# Patient Record
Sex: FEMALE | Race: BLACK OR AFRICAN AMERICAN | NOT HISPANIC OR LATINO
[De-identification: names, ages, dates, MRNs, and addresses within clinical notes are randomized per-mention and may not be internally consistent; named-entity substitution may affect disease eponyms.]

---

## 2022-11-01 ENCOUNTER — RESULT REVIEW (OUTPATIENT)
Age: 62
End: 2022-11-01

## 2023-04-29 ENCOUNTER — EMERGENCY (EMERGENCY)
Facility: HOSPITAL | Age: 63
LOS: 1 days | Discharge: HOME | End: 2023-04-29
Attending: EMERGENCY MEDICINE | Admitting: EMERGENCY MEDICINE
Payer: COMMERCIAL

## 2023-04-29 VITALS
DIASTOLIC BLOOD PRESSURE: 80 MMHG | OXYGEN SATURATION: 100 % | RESPIRATION RATE: 18 BRPM | HEART RATE: 64 BPM | TEMPERATURE: 98 F | SYSTOLIC BLOOD PRESSURE: 138 MMHG

## 2023-04-29 VITALS
TEMPERATURE: 98 F | DIASTOLIC BLOOD PRESSURE: 88 MMHG | HEART RATE: 73 BPM | OXYGEN SATURATION: 100 % | SYSTOLIC BLOOD PRESSURE: 180 MMHG | RESPIRATION RATE: 18 BRPM

## 2023-04-29 LAB
ALBUMIN SERPL ELPH-MCNC: 4.2 G/DL — SIGNIFICANT CHANGE UP (ref 3.3–5)
ALP SERPL-CCNC: 79 U/L — SIGNIFICANT CHANGE UP (ref 40–120)
ALT FLD-CCNC: 30 U/L — SIGNIFICANT CHANGE UP (ref 4–33)
ANION GAP SERPL CALC-SCNC: 13 MMOL/L — SIGNIFICANT CHANGE UP (ref 7–14)
APPEARANCE UR: CLEAR — SIGNIFICANT CHANGE UP
AST SERPL-CCNC: 23 U/L — SIGNIFICANT CHANGE UP (ref 4–32)
BASOPHILS # BLD AUTO: 0.03 K/UL — SIGNIFICANT CHANGE UP (ref 0–0.2)
BASOPHILS NFR BLD AUTO: 0.9 % — SIGNIFICANT CHANGE UP (ref 0–2)
BILIRUB SERPL-MCNC: 0.3 MG/DL — SIGNIFICANT CHANGE UP (ref 0.2–1.2)
BILIRUB UR-MCNC: NEGATIVE — SIGNIFICANT CHANGE UP
BUN SERPL-MCNC: 13 MG/DL — SIGNIFICANT CHANGE UP (ref 7–23)
CALCIUM SERPL-MCNC: 9.7 MG/DL — SIGNIFICANT CHANGE UP (ref 8.4–10.5)
CHLORIDE SERPL-SCNC: 102 MMOL/L — SIGNIFICANT CHANGE UP (ref 98–107)
CO2 SERPL-SCNC: 26 MMOL/L — SIGNIFICANT CHANGE UP (ref 22–31)
COLOR SPEC: SIGNIFICANT CHANGE UP
CREAT SERPL-MCNC: 0.83 MG/DL — SIGNIFICANT CHANGE UP (ref 0.5–1.3)
DIFF PNL FLD: NEGATIVE — SIGNIFICANT CHANGE UP
EGFR: 80 ML/MIN/1.73M2 — SIGNIFICANT CHANGE UP
EOSINOPHIL # BLD AUTO: 0.09 K/UL — SIGNIFICANT CHANGE UP (ref 0–0.5)
EOSINOPHIL NFR BLD AUTO: 2.6 % — SIGNIFICANT CHANGE UP (ref 0–6)
GLUCOSE SERPL-MCNC: 149 MG/DL — HIGH (ref 70–99)
GLUCOSE UR QL: NEGATIVE — SIGNIFICANT CHANGE UP
HCT VFR BLD CALC: 43.9 % — SIGNIFICANT CHANGE UP (ref 34.5–45)
HGB BLD-MCNC: 14.1 G/DL — SIGNIFICANT CHANGE UP (ref 11.5–15.5)
IANC: 1.25 K/UL — LOW (ref 1.8–7.4)
IMM GRANULOCYTES NFR BLD AUTO: 0.3 % — SIGNIFICANT CHANGE UP (ref 0–0.9)
KETONES UR-MCNC: NEGATIVE — SIGNIFICANT CHANGE UP
LEUKOCYTE ESTERASE UR-ACNC: NEGATIVE — SIGNIFICANT CHANGE UP
LIDOCAIN IGE QN: 40 U/L — SIGNIFICANT CHANGE UP (ref 7–60)
LYMPHOCYTES # BLD AUTO: 1.74 K/UL — SIGNIFICANT CHANGE UP (ref 1–3.3)
LYMPHOCYTES # BLD AUTO: 51 % — HIGH (ref 13–44)
MCHC RBC-ENTMCNC: 27.7 PG — SIGNIFICANT CHANGE UP (ref 27–34)
MCHC RBC-ENTMCNC: 32.1 GM/DL — SIGNIFICANT CHANGE UP (ref 32–36)
MCV RBC AUTO: 86.2 FL — SIGNIFICANT CHANGE UP (ref 80–100)
MONOCYTES # BLD AUTO: 0.29 K/UL — SIGNIFICANT CHANGE UP (ref 0–0.9)
MONOCYTES NFR BLD AUTO: 8.5 % — SIGNIFICANT CHANGE UP (ref 2–14)
NEUTROPHILS # BLD AUTO: 1.25 K/UL — LOW (ref 1.8–7.4)
NEUTROPHILS NFR BLD AUTO: 36.7 % — LOW (ref 43–77)
NITRITE UR-MCNC: NEGATIVE — SIGNIFICANT CHANGE UP
NRBC # BLD: 0 /100 WBCS — SIGNIFICANT CHANGE UP (ref 0–0)
NRBC # FLD: 0 K/UL — SIGNIFICANT CHANGE UP (ref 0–0)
PH UR: 6 — SIGNIFICANT CHANGE UP (ref 5–8)
PLATELET # BLD AUTO: 283 K/UL — SIGNIFICANT CHANGE UP (ref 150–400)
POTASSIUM SERPL-MCNC: 3.7 MMOL/L — SIGNIFICANT CHANGE UP (ref 3.5–5.3)
POTASSIUM SERPL-SCNC: 3.7 MMOL/L — SIGNIFICANT CHANGE UP (ref 3.5–5.3)
PROT SERPL-MCNC: 7.3 G/DL — SIGNIFICANT CHANGE UP (ref 6–8.3)
PROT UR-MCNC: ABNORMAL
RBC # BLD: 5.09 M/UL — SIGNIFICANT CHANGE UP (ref 3.8–5.2)
RBC # FLD: 13.5 % — SIGNIFICANT CHANGE UP (ref 10.3–14.5)
SODIUM SERPL-SCNC: 141 MMOL/L — SIGNIFICANT CHANGE UP (ref 135–145)
SP GR SPEC: 1.02 — SIGNIFICANT CHANGE UP (ref 1.01–1.05)
UROBILINOGEN FLD QL: SIGNIFICANT CHANGE UP
WBC # BLD: 3.41 K/UL — LOW (ref 3.8–10.5)
WBC # FLD AUTO: 3.41 K/UL — LOW (ref 3.8–10.5)

## 2023-04-29 PROCEDURE — 99284 EMERGENCY DEPT VISIT MOD MDM: CPT

## 2023-04-29 PROCEDURE — 76830 TRANSVAGINAL US NON-OB: CPT | Mod: 26

## 2023-04-29 PROCEDURE — 76770 US EXAM ABDO BACK WALL COMP: CPT | Mod: 26

## 2023-04-29 NOTE — ED ADULT NURSE NOTE - OBJECTIVE STATEMENT
Received the patient in spot 26 with c/o LLQ abdominal pain for two weeks and came today to check everything is Ok. Not in acute distress. Alert and oriented x 4, safety maintained.  20G IV line inserted in left AC. Due labs sent. will continue to monitor.

## 2023-04-29 NOTE — ED PROVIDER NOTE - CLINICAL SUMMARY MEDICAL DECISION MAKING FREE TEXT BOX
Allyson Chavarria DO PGY-2  62 year old female with PMH HTN presents to the ED with 2 weeks of sharp LLQ pain, intermittent, lasts minutes long and can sometimes be severe. Nontoxic appearing, hemodynamically stable. Will evaluate for intraabdominal pathology, obtain CT, UA, labs, treat pain prn, and re-assess for dispo. Allyson Chavarria DO PGY-2  62 year old female with PMH HTN presents to the ED with 2 weeks of sharp LLQ pain, intermittent, lasts minutes long and can sometimes be severe. Nontoxic appearing, hemodynamically stable. Pain now mild. Will obtain labs, TVUS and renal US, labs, UA.

## 2023-04-29 NOTE — ED PROVIDER NOTE - NSFOLLOWUPCLINICS_GEN_ALL_ED_FT
[facility] General Internal Medicine  General Internal Medicine  [address] [place] [id number]  Phone: [phone]  Fax:   Follow Up Time: 4-6 Days    Gastroenterology at [facility]  Gastroenterology  [address] [place] [id number]  Phone: [phone]  Fax:   Follow Up Time: 4-6 Days

## 2023-04-29 NOTE — ED PROVIDER NOTE - PHYSICAL EXAMINATION
PHYSICAL EXAM:  CONSTITUTIONAL: Well appearing, awake, alert, oriented to person, place, time/situation and in no apparent distress.  HEAD: Atraumatic  EYES: Clear bilaterally, pupils equal, round and reactive to light.  ENMT: Airway patent, Nasal mucosa clear. Mouth with normal mucosa. Uvula is midline.   CARDIAC: Normal rate, regular rhythm. +S1/S2. No murmurs, rubs or gallops.  RESPIRATORY: Breathing unlabored. Breath sounds clear and equal bilaterally.  ABDOMEN:  LLQ tenderness to palpation. Soft, abdomen otherwise nontender, nondistended. No rebound tenderness or guarding.  NEUROLOGICAL: Alert and oriented, no focal deficits, no motor or sensory deficits.   MSK: No clubbing, cyanosis, or edema. Full range of motion of all extremities.   SKIN: Skin warm and dry. No evidence of rashes or lesions.

## 2023-04-29 NOTE — ED PROVIDER NOTE - PATIENT PORTAL LINK FT
You can access the FollowMyHealth Patient Portal offered by [facility] by registering at the following website: [email or web address]. By joining [facility]’s FollowMyHealth portal, you will also be able to view your health information using other applications (apps) compatible with our system.

## 2023-04-29 NOTE — ED PROVIDER NOTE - NSFOLLOWUPINSTRUCTIONS_ED_ALL_ED_FT
(1) Follow up with your primary care physician as discussed. Listed below are the specialists that will be necessary to see as an outpatient to continue the workup.  Please call the numbers listed below or [phone] to set up the necessary appointments.    (2) Immediately seek care at your nearest emergency room if your symptoms worsen, persist, or do not resolve     Abdominal Pain    WHAT YOU NEED TO KNOW:    Abdominal pain can be dull, achy, or sharp. You may have pain in one area of your abdomen, or in your entire abdomen. Your pain may be caused by a condition such as constipation, food sensitivity or poisoning, infection, or a blockage. Abdominal pain can also be from a hernia, appendicitis, or an ulcer. Liver, gallbladder, or kidney conditions can also cause abdominal pain. The cause of your abdominal pain may not be known.     DISCHARGE INSTRUCTIONS:    Call your local emergency number (911 in the ) if:   •You have new chest pain or shortness of breath.    Return to the emergency department if:   •You have pulsing pain in your upper abdomen or lower back that suddenly becomes constant.  •Your pain is in the right lower abdominal area and worsens with movement.  •You have a fever over 100.4°F (38°C) or shaking chills.  •You are vomiting and cannot keep food or liquids down.  •Your pain does not improve or gets worse over the next 8 to 12 hours.  •You see blood in your vomit or bowel movements, or they look black and tarry.  •Your skin or the whites of your eyes turn yellow.  •You are a woman and have a large amount of vaginal bleeding that is not your monthly period.    Call your doctor if:   •You have pain in your lower back.  •You are a man and have pain in your testicles.  •You have pain when you urinate.  •You have questions or concerns about your condition or care.

## 2023-04-29 NOTE — ED PROVIDER NOTE - OBJECTIVE STATEMENT
62 year old female with PMH HTN presents to the ED with 2 weeks of sharp LLQ pain, intermittent, lasts minutes long and can sometimes be severe. No radiation. Denies fever, chills, chest pain, shortness of breath, nausea, vomiting, diarrhea, dysuria, hematuria, blood in stools. No history of similar symptoms. Has history of multiple abdominal surgeries including c section, cholecystectomy, hernia repairs. Had colonoscopy 1 year ago with polyps. Had normal bowel movement this AM.

## 2023-04-29 NOTE — ED PROVIDER NOTE - PROGRESS NOTE DETAILS
O'Sam DO PGY-3: received sign out on this patient. Dispo pending US/TVUS O'Sam DO PGY-3: Bimanual exam performed (chaperoned by Dr. Gonzales), no masses or gross adnexal tenderness noted Hari NEAL PGY-3: Results explained to patient. Explained strict return precautions. Will dc w/ internal med clinic info and GI clinic info

## 2023-04-29 NOTE — ED PROVIDER NOTE - NSICDXPASTSURGICALHX_GEN_ALL_CORE_FT
PAST SURGICAL HISTORY:  Delivery by emergency caesarean section     H/O hernia repair     History of cholecystectomy

## 2023-04-30 LAB
CULTURE RESULTS: SIGNIFICANT CHANGE UP
SPECIMEN SOURCE: SIGNIFICANT CHANGE UP

## 2023-07-25 ENCOUNTER — EMERGENCY (EMERGENCY)
Facility: HOSPITAL | Age: 63
LOS: 1 days | Discharge: HOME | End: 2023-07-25
Attending: EMERGENCY MEDICINE | Admitting: EMERGENCY MEDICINE
Payer: COMMERCIAL

## 2023-07-25 VITALS
HEART RATE: 77 BPM | TEMPERATURE: 98 F | OXYGEN SATURATION: 100 % | DIASTOLIC BLOOD PRESSURE: 94 MMHG | RESPIRATION RATE: 18 BRPM | SYSTOLIC BLOOD PRESSURE: 183 MMHG

## 2023-07-25 VITALS
SYSTOLIC BLOOD PRESSURE: 154 MMHG | RESPIRATION RATE: 16 BRPM | OXYGEN SATURATION: 100 % | TEMPERATURE: 98 F | HEART RATE: 65 BPM | DIASTOLIC BLOOD PRESSURE: 87 MMHG

## 2023-07-25 PROCEDURE — 99284 EMERGENCY DEPT VISIT MOD MDM: CPT

## 2023-07-25 RX ORDER — ACETAMINOPHEN 500 MG
975 TABLET ORAL ONCE
Refills: 0 | Status: COMPLETED | OUTPATIENT
Start: 2023-07-25 | End: 2023-07-25

## 2023-07-25 RX ORDER — KETOROLAC TROMETHAMINE 30 MG/ML
15 SYRINGE (ML) INJECTION ONCE
Refills: 0 | Status: DISCONTINUED | OUTPATIENT
Start: 2023-07-25 | End: 2023-07-25

## 2023-07-25 RX ORDER — SODIUM CHLORIDE 9 MG/ML
1000 INJECTION INTRAMUSCULAR; INTRAVENOUS; SUBCUTANEOUS ONCE
Refills: 0 | Status: COMPLETED | OUTPATIENT
Start: 2023-07-25 | End: 2023-07-25

## 2023-07-25 RX ORDER — METOCLOPRAMIDE HCL 10 MG
10 TABLET ORAL ONCE
Refills: 0 | Status: COMPLETED | OUTPATIENT
Start: 2023-07-25 | End: 2023-07-25

## 2023-07-25 RX ADMIN — SODIUM CHLORIDE 1000 MILLILITER(S): 9 INJECTION INTRAMUSCULAR; INTRAVENOUS; SUBCUTANEOUS at 02:52

## 2023-07-25 RX ADMIN — Medication 15 MILLIGRAM(S): at 02:53

## 2023-07-25 RX ADMIN — Medication 10 MILLIGRAM(S): at 02:54

## 2023-07-25 RX ADMIN — Medication 15 MILLIGRAM(S): at 03:37

## 2023-07-25 RX ADMIN — Medication 975 MILLIGRAM(S): at 02:53

## 2023-07-25 RX ADMIN — Medication 975 MILLIGRAM(S): at 03:37

## 2023-07-25 NOTE — ED PROVIDER NOTE - PATIENT PORTAL LINK FT
You can access the FollowMyHealth Patient Portal offered by [facility] by registering at the following website: [email or web address]. By joining [facility]’s FollowMyHealth portal, you will also be able to view your health information using other applications (apps) compatible with our system.

## 2023-07-25 NOTE — ED PROVIDER NOTE - CLINICAL SUMMARY MEDICAL DECISION MAKING FREE TEXT BOX
62-year-old female with history of hypertension, migraine presenting to ED with headache x2 days, sharp shooting in nature, intermittent.   No associated symptoms like nausea, vomiting,  fever, chills, vision changes.  Has not take anything for pain.  Vital stable.  Exam without any focal neurodeficits, clear lungs auscultation, no murmurs rubs or gallops, no abdominal tenderness palpation.  this is likely her normal migraine headache.  Will treat with Reglan, Toradol, Tylenol and fluids.  Will reassess.  No indication for imaging at this time.  close reassessment. 62-year-old female with history of hypertension, migraine presenting to ED with headache x2 days, sharp shooting in nature, intermittent.   No associated symptoms like nausea, vomiting,  fever, chills, vision changes.  Has not take anything for pain.  No red flag sx of HA.  Vital stable.  Exam without any focal neurodeficits, clear lungs auscultation, no murmurs rubs or gallops, no abdominal tenderness palpation.  this is likely her normal migraine headache.  Will treat with Reglan, Toradol, Tylenol and fluids.  Will reassess.  No indication for imaging at this time.  close reassessment.

## 2023-07-25 NOTE — ED ADULT NURSE NOTE - NSFALLUNIVINTERV_ED_ALL_ED
Bed/Stretcher in lowest position, wheels locked, appropriate side rails in place/Call bell, personal items and telephone in reach/Instruct patient to call for assistance before getting out of bed/chair/stretcher/Non-slip footwear applied when patient is off stretcher/Clarissa to call system/Physically safe environment - no spills, clutter or unnecessary equipment/Purposeful proactive rounding/Room/bathroom lighting operational, light cord in reach

## 2023-07-25 NOTE — ED PROVIDER NOTE - NSFOLLOWUPINSTRUCTIONS_ED_ALL_ED_FT
You were seen in the Emergency Department for headache. Lab and imaging results, if performed, were discussed with you along with your discharge diagnosis.    You will receive a call to make an appointment with Neurology. List of providers and their information has been given. Follow up with your doctor in 1 week - bring copies of your results if you were given. If you do not have a primary doctor, please call [phone] to find one convenient for you.    Continue all prescribed medications.     To control your pain at home, you should take Ibuprofen 400 mg along with Tylenol 650mg-1000mg every 6 to 8 hours. Limit your maximum daily Tylenol from all sources to 4000mg. Be aware that many other medications contain acetaminophen which is also known as Tylenol. Taking Tylenol and Ibuprofen together has been shown to be more effective at relieving pain than taking them separately. These are both over the counter medications that you can  at your local pharmacy without a prescription. You need to respect all of the warnings on the bottles. You shouldn’t take these medications for more than a week without following up with your doctor. Both medications come with certain risks and side effects that you need to discuss with your doctor, especially if you are taking them for a prolonged period.    Return to ED for any new or worsening symptoms including but not limited to: development of chest pain, shortness of breath, fever, vomiting, focal numbness, weakness or tingling, any severe CP, headache, abdominal pain, back pain.      Rest and keep yourself hydrated with fluids.

## 2023-07-25 NOTE — ED PROVIDER NOTE - ATTENDING CONTRIBUTION TO CARE
62-year-old woman, history of hypertension and migraines now presents with 2 days of right episodic temporal sharp shooting headaches that are unprovoked.  She describes headaches like this in the past but not as intense.  No URI symptoms but patient states that she has had episodes of fullness in her right ear when the headaches come which resolved when the headaches go away.  No visual changes, confusion per  at bedside, unsteadiness or falls, strength or sensory changes/deficits.  No fever or B symptoms, URI symptoms, urinary symptoms, neck stiffness or pain.  No photophobia or phonophobia.  No trauma.    VS: afebrile, others notable for HTN, otherwise reassuring   Gen: Well appearing in NAD  Head: NC/AT  ENT: moist mucous membranes, R TM clear   Neck: trachea midline, FROM (painless) - neg Brudzinski   Resp:  No distress  Ext: no deformities  Neuro: A&Ox3, spont. interactive. CN2-12 intact. GCS 15. Strength 5/5 b/l UEs & LEs. No gross sensory deficits. No pronator drift b/l UEs. Finger to nose intact b/l. gait normal/stable/unassisted.   Skin:  Warm and dry as visualized  Psych:  appropriate affect and mood    Initial ED MDM: well appearing pt w/ reassuring exam, hx also not c/w ICH, meningitis, CVST or CVA.  Will treat symptomatically & reassess.

## 2023-07-25 NOTE — ED PROVIDER NOTE - PHYSICAL EXAMINATION
General: Alert and Orientated x 3. No apparent distress.  Head: Normocephalic and atraumatic.  Ears:   TMs intact bilaterally, appropriate light reflex.  Eyes: PERRLA with EOMI.  Neck: Supple. Trachea midline.   Cardiac: Normal S1 and S2 w/ RRR. No murmurs appreciated. No JVD appreciated.  Pulmonary: CTA bilaterally. No increased WOB. No wheezes or crackles.  Abdominal: Soft, non-tender. (+) bowel sounds appreciated in all 4 quadrants. No hepatosplenomegaly.   Neurologic:  cranial nerves II to XII  grossly intact, finger-to-nose normal, gait normal.   Musculoskeletal: Strength appropriate in all 4 extremities for age with no limited ROM.  Skin: Color appropriate for race. Intact, warm, and well-perfused.  Psychiatric: Appropriate mood and affect. No apparent risk to self or others.

## 2023-07-25 NOTE — ED PROVIDER NOTE - OBJECTIVE STATEMENT
Patient is a 62-year-old female with history of hypertension, migraines presenting to the ED with headache.  Headache started 2 days ago, it is in the right  temporal region, it is coming and going in nature, sharp shooting.  Has had headaches like this in the past.  States it was preceded by ringing in her ear.  Has not taken anything for the pain.  No vision changes, nausea, vomiting, fever, chills,   Neck stiffness, numbness, tingling, photophobia, phonophobia, URI symptoms, urinary changes, bowel changes,  falls or injuries.  Has not followed up with a neurologist for her persistent headaches.

## 2023-07-25 NOTE — ED ADULT TRIAGE NOTE - CHIEF COMPLAINT QUOTE
c/o intermittent headache (top of head) since yesterday. currently endorsing no pain at this time. hx migraine, HTN

## 2023-07-25 NOTE — ED PROVIDER NOTE - NS ED ROS FT
CONSTITUTIONAL: No fevers, no chills, no lightheadedness, no dizziness  EYES: no visual changes, no eye pain  EARS: no ear drainage, no ear pain, no change in hearing  NOSE: no nasal congestion  MOUTH/THROAT: no sore throat  CV: No chest pain, no palpitations  RESP: No SOB, no cough  GI: No n/v/d, no abd pain  : no dysuria, no hematuria, no flank pain  MSK: no back pain, no extremity pain  SKIN: no rashes  NEURO: + headache, no focal weakness, no decreased sensation/parasthesias   PSYCHIATRIC: no known mental health issues

## 2023-07-25 NOTE — ED ADULT NURSE NOTE - OBJECTIVE STATEMENT
BREAK RN: report rcd from primary RN Yasmany. pt a&ox4 c/o intermittent headache that began yesterday worsening prior to arrival bring pt to ER. pt past medical history of HTN and migraines. pt states feels like previous migraines. pt with no vision changes. pt with no facial droop. pt no neuro or sensory deficits. pt abdomen soft and nondistended. pt denies chest pain, sob, nausea, vomiting, dizziness, headache, fevers/chills at this time. 20g to the LAC with no redness or swelling. pt endorses relief from medication modalities from primary RN. pt appears in NAD. pt respirations even and unlabored with no accessory muscle use pending dispo.

## 2023-07-25 NOTE — ED ADULT NURSE NOTE - NSICDXPASTSURGICALHX_GEN_ALL_CORE_FT
1
PAST SURGICAL HISTORY:  Delivery by emergency caesarean section     H/O hernia repair     History of cholecystectomy

## 2023-09-24 ENCOUNTER — EMERGENCY (EMERGENCY)
Facility: HOSPITAL | Age: 63
LOS: 1 days | Discharge: HOME | End: 2023-09-24
Attending: EMERGENCY MEDICINE | Admitting: EMERGENCY MEDICINE
Payer: COMMERCIAL

## 2023-09-24 VITALS
OXYGEN SATURATION: 100 % | DIASTOLIC BLOOD PRESSURE: 83 MMHG | TEMPERATURE: 99 F | HEART RATE: 89 BPM | SYSTOLIC BLOOD PRESSURE: 176 MMHG | RESPIRATION RATE: 18 BRPM

## 2023-09-24 PROCEDURE — 73610 X-RAY EXAM OF ANKLE: CPT | Mod: 26,LT

## 2023-09-24 PROCEDURE — 99284 EMERGENCY DEPT VISIT MOD MDM: CPT

## 2023-09-24 PROCEDURE — 73630 X-RAY EXAM OF FOOT: CPT | Mod: 26,LT

## 2023-09-24 RX ORDER — ACETAMINOPHEN 500 MG
975 TABLET ORAL ONCE
Refills: 0 | Status: COMPLETED | OUTPATIENT
Start: 2023-09-24 | End: 2023-09-24

## 2023-09-24 RX ORDER — ACETAMINOPHEN 500 MG
2 TABLET ORAL
Qty: 100 | Refills: 0
Start: 2023-09-24

## 2023-09-24 RX ORDER — MELOXICAM 15 MG/1
1 TABLET ORAL
Qty: 14 | Refills: 0
Start: 2023-09-24

## 2023-09-24 RX ORDER — IBUPROFEN 200 MG
400 TABLET ORAL ONCE
Refills: 0 | Status: COMPLETED | OUTPATIENT
Start: 2023-09-24 | End: 2023-09-24

## 2023-09-24 RX ADMIN — Medication 975 MILLIGRAM(S): at 10:22

## 2023-09-24 RX ADMIN — Medication 400 MILLIGRAM(S): at 10:22

## 2023-09-24 NOTE — ED PROVIDER NOTE - PATIENT PORTAL LINK FT
You can access the FollowMyHealth Patient Portal offered by [facility] by registering at the following website: [email or web address]. By joining [facility]’s FollowMyHealth portal, you will also be able to view your health information using other applications (apps) compatible with our system.

## 2023-09-24 NOTE — ED PROVIDER NOTE - NSFOLLOWUPINSTRUCTIONS_ED_ALL_ED_FT
Take medications as prescribed for: left ankle injury.  Tylenol  Mobic    Use crutches until you see Podiatry. Call [phone] and ask for an appointment at a convenient location.     Use cold compresses (such as ice in a plastic bag) over affected areas for 15 minutes 3 times a day x 3 days.     Return if pain not controlled with oral medications.     Keep foot elevated when possible. Take medications as prescribed for: left ankle injury.  Tylenol  Mobic    Use crutches and post-op shoe until you see Podiatry. Call [phone] and ask for an appointment at a convenient location.     Use cold compresses (such as ice in a plastic bag) over affected areas for 15 minutes 3 times a day x 3 days.     Return if pain not controlled with oral medications.     Keep foot elevated when possible.

## 2023-09-24 NOTE — ED PROVIDER NOTE - PHYSICAL EXAMINATION
Well-appearing, well nourished, awake, alert, oriented to person, place, time/situation and in no apparent distress.    Airway patent. Neck supple.    Eyes without scleral injection. No jaundice.    Strong pulse.    Respirations unlabored.    Abdomen soft, non-tender, no guarding.    MSK: NVI. L ankle swollen, TTP across juncture of foot and ankle. L foot swollen dorsal aspect.    Alert and oriented, no gross motor or sensory deficits.    Skin: normal color for race, warm, dry and intact. No evidence of rash.    No SI/HI.

## 2023-09-24 NOTE — ED ADULT TRIAGE NOTE - CHIEF COMPLAINT QUOTE
Pt presents to ED ambulatory from home with c/o L ankle pain s/p trip and fall 3 days ago. Pt reports she missed a step and fell down, denies LOC head strike or anticoagulant use.

## 2023-09-24 NOTE — ED PROVIDER NOTE - OBJECTIVE STATEMENT
Jong: 1.5 days ago, accidental inversion injury L ankle when missed a stair. C/o pain there. Using crutches now. PMH: HTN. No allergies.

## 2025-01-19 ENCOUNTER — EMERGENCY (EMERGENCY)
Facility: HOSPITAL | Age: 65
LOS: 1 days | Discharge: HOME | End: 2025-01-19
Attending: EMERGENCY MEDICINE | Admitting: EMERGENCY MEDICINE
Payer: COMMERCIAL

## 2025-01-19 VITALS
RESPIRATION RATE: 16 BRPM | TEMPERATURE: 98 F | DIASTOLIC BLOOD PRESSURE: 90 MMHG | HEART RATE: 76 BPM | SYSTOLIC BLOOD PRESSURE: 139 MMHG | OXYGEN SATURATION: 97 %

## 2025-01-19 VITALS
WEIGHT: 220.02 LBS | TEMPERATURE: 98 F | HEART RATE: 81 BPM | OXYGEN SATURATION: 98 % | RESPIRATION RATE: 16 BRPM | SYSTOLIC BLOOD PRESSURE: 150 MMHG | DIASTOLIC BLOOD PRESSURE: 95 MMHG

## 2025-01-19 PROCEDURE — 99284 EMERGENCY DEPT VISIT MOD MDM: CPT

## 2025-01-19 RX ORDER — LIDOCAINE 50 MG/G
1 OINTMENT TOPICAL ONCE
Refills: 0 | Status: COMPLETED | OUTPATIENT
Start: 2025-01-19 | End: 2025-01-19

## 2025-01-19 RX ORDER — DIAZEPAM 5 MG
1 TABLET ORAL
Qty: 3 | Refills: 0
Start: 2025-01-19 | End: 2025-01-21

## 2025-01-19 RX ORDER — ACETAMINOPHEN 80 MG/.8ML
650 SOLUTION/ DROPS ORAL ONCE
Refills: 0 | Status: COMPLETED | OUTPATIENT
Start: 2025-01-19 | End: 2025-01-19

## 2025-01-19 RX ORDER — DIAZEPAM 5 MG
5 TABLET ORAL ONCE
Refills: 0 | Status: DISCONTINUED | OUTPATIENT
Start: 2025-01-19 | End: 2025-01-19

## 2025-01-19 RX ADMIN — ACETAMINOPHEN 650 MILLIGRAM(S): 80 SOLUTION/ DROPS ORAL at 05:11

## 2025-01-19 RX ADMIN — Medication 5 MILLIGRAM(S): at 05:11

## 2025-01-19 RX ADMIN — LIDOCAINE 1 PATCH: 50 OINTMENT TOPICAL at 05:11

## 2025-01-19 NOTE — ED ADULT NURSE NOTE - OBJECTIVE STATEMENT
BREAK RN: Pt arrives to room 12B A&Ox3 and ambulatory at baseline. PH of HTN. Pt comes to ED c/o R sided flank pain. Pt states she was hanging laundry yesterday and reaching far above boxes when she felt pain to her R flank. Pt states she took motrin at home without relief of pain. Pt states pain is only when she moves. Denies headache, dizziness, chest pain, SOB, fevers, chills, nausea, vomiting and diarrhea at this time. Respirations even and unlabored on room air. No acute distress noted. Pt medicated per EMAR. Plan of care ongoing, comfort measures provided and safety measures maintained. Awaiting disposition.

## 2025-01-19 NOTE — ED PROVIDER NOTE - OBJECTIVE STATEMENT
64-year-old female PMHx significant for HTN presenting with sudden onset of right-sided back pain.  Patient states yesterday while stretching felt a pulling sensation to the back.  Since then twists and turns have exacerbate her pain.  She has been ambulatory.  She denies red flag symptoms including trauma to the area, unexplained weight loss, neurologic symptoms, fevers, IV drug use, history of cancer or steroid usage.  Has taken Motrin x 2 since onset of symptoms with minimal relief which prompted visit to the ED today.  Otherwise no other reported symptoms.

## 2025-01-19 NOTE — ED ADULT TRIAGE NOTE - CHIEF COMPLAINT QUOTE
Pt c/o R sided pain s/p stretching to hang something up on wall. Denies any chest pain, SOB. Hx HTN. Well appearing

## 2025-01-19 NOTE — ED PROVIDER NOTE - NSFOLLOWUPINSTRUCTIONS_ED_ALL_ED_FT
You were seen in the emergency department for back pain.  Here your physical exam is reassuring and does not show any concerning findings.  Given this, you are being discharged with a medication for likely muscle spasm of your back.  I am sending a prescription for Valium for a couple days which is a muscle relaxer.  Please take as prescribed.  WHILE USING THIS MEDICATION DO NOT DRIVE OR OPPERATE HEAVY MACHINERY.  You may also take Tylenol 500 mg every 6 hours with ibuprofen every 6 hours with food.    If you develop fevers, chills, worsening pain, inability to ambulate, signs of neurologic dysfunction including numbness or tingling, urinary or bowel incontinence or for any other questions or concerns please return to the emergency department.    Please follow-up with your primary care physician in next 1-3 days for further management of any symptoms.    Acute Low Back Pain    AMBULATORY CARE:    Acute low back pain is sudden discomfort that lasts up to 6 weeks and makes activity difficult.    Common symptoms include the following:    Back stiffness or spasms    Pain down the back or side of one leg    Holding yourself in an unusual position or posture to decrease your back pain    Not being able to find a sitting position that is comfortable    Slow increase in your pain for 24 to 48 hours after you stress your back    Tenderness on your lower back or severe pain when you move your back  Seek care immediately if:    You have severe pain.    You have sudden stiffness and heaviness on both buttocks down to both legs.    You have numbness or weakness in one leg, or pain in both legs.    You have numbness in your genital area or across your lower back.    You cannot control your urine or bowel movements.  Call your doctor if:    You have a fever.    You have pain at night or when you rest.    Your pain does not get better with treatment.    You have pain that worsens when you cough or sneeze.    You suddenly feel something pop or snap in your back.    You have questions or concerns about your condition or care.  The goal of treatment for acute low back pain is to relieve your pain and help you be able to do your regular activities. Most people with acute lower back pain get better within 4 to 6 weeks. You may need any of the following:    NSAIDs, such as ibuprofen, help decrease swelling, pain, and fever. This medicine is available with or without a doctor's order. NSAIDs can cause stomach bleeding or kidney problems in certain people. If you take blood thinner medicine, always ask your healthcare provider if NSAIDs are safe for you. Always read the medicine label and follow directions.    Acetaminophen decreases pain and fever. It is available without a doctor's order. Ask how much to take and how often to take it. Follow directions. Read the labels of all other medicines you are using to see if they also contain acetaminophen, or ask your doctor or pharmacist. Acetaminophen can cause liver damage if not taken correctly.    Muscle relaxers decrease pain by relaxing the muscles in your lower spine.    Prescription pain medicine may be given. Ask your healthcare provider how to take this medicine safely. Some prescription pain medicines contain acetaminophen. Do not take other medicines that contain acetaminophen without talking to your healthcare provider. Too much acetaminophen may cause liver damage. Prescription pain medicine may cause constipation. Ask your healthcare provider how to prevent or treat constipation.  Manage your symptoms:    Stay active as much as you can without causing more pain. Bed rest could make your back pain worse. Start with some light exercises such as walking. Avoid heavy lifting until your pain is gone. Ask for more information about the activities or exercises that are right for you.  Diverse Family Walking for Exercise      Apply heat on your back for 20 to 30 minutes every 2 hours for as many days as directed. Heat helps decrease pain and muscle spasms. Alternate heat and ice.    Apply ice on your back for 15 to 20 minutes every hour or as directed. Use an ice pack, or put crushed ice in a plastic bag. Cover it with a towel before you apply it to your skin. Ice helps prevent tissue damage and decreases swelling and pain.  Prevent acute low back pain:    Use proper body mechanics.  Bend at the hips and knees when you  objects. Do not bend from the waist. Use your leg muscles as you lift the load. Do not use your back. Keep the object close to your chest as you lift it. Try not to twist or lift anything above your waist.  How to Lift Items Safely      Change your position often when you stand for long periods of time. Rest one foot on a small box or footrest, and then switch to the other foot often.    Try not to sit for long periods of time. When you do, sit in a straight-backed chair with your feet flat on the floor. Never reach, pull, or push while you are sitting.    Do exercises that strengthen your back muscles. Warm up before you exercise. Ask your healthcare provider the best exercises for you.  Warm up and Cool Down       Maintain a healthy weight. Ask your healthcare provider what a healthy weight is for you. Ask him or her to help you create a weight loss plan if you are overweight.  Follow up with your doctor as directed: Return for a follow-up visit if you still have pain after 1 to 3 weeks of treatment. You may need to visit an orthopedist if your back pain lasts longer than 12 weeks. Write down your questions so you remember to ask them during your visits.

## 2025-01-19 NOTE — ED PROVIDER NOTE - PHYSICAL EXAMINATION
GEN: Patient awake and alert. No acute distress, non-toxic.  Head: normocephalic, atraumatic.  Neck: Nontender, full ROM.  Eyes: PERRLA b/l. EOMI  CARDIAC: RRR.  PULM: CTA B/L no wheeze, rales or rhonchi. No signs of respiratory distress, no accessory muscle usage or nasal flaring.  ABD: Soft, nontender, nondistended. No rebound, no involuntary guarding.  : No CVA tenderness, no suprapubic tenderness.  MSK: Moving all extremities spontaneously. 5/5 strength and full ROM in all extremities. No obvious deformity.  NEURO: A&Ox3, no focal neurological deficits, CN 2-12 grossly intact.  Gait normal.  SKIN: Warm, dry, no rash

## 2025-01-19 NOTE — ED PROVIDER NOTE - PATIENT PORTAL LINK FT
You can access the FollowMyHealth Patient Portal offered by [facility] by registering at the following website: [email or web address]. By joining [employer]’s FollowMyHealth portal, you will also be able to view your health information using other applications (apps) compatible with our system.

## 2025-01-19 NOTE — ED PROVIDER NOTE - CLINICAL SUMMARY MEDICAL DECISION MAKING FREE TEXT BOX
64-year-old female PMHx significant for HTN presenting with sudden onset of right-sided back pain.   Physical exam notable for point tenderness to the right lateral back without erythema, signs of trauma, or neurologic symptoms appreciated.  Given HPI likely symptoms 2/2 musculoskeletal etiology, likely musculoskeletal muscular spasm.  Will provide analgesia and reassess.

## 2025-01-19 NOTE — ED PROVIDER NOTE - ATTENDING CONTRIBUTION TO CARE
DR. CASTRO, ATTENDING MD-  I performed a face to face bedside interview with the patient regarding history of present illness, review of symptoms and past medical history. I completed an independent physical exam.  I have discussed the patient's plan of care with the resident.   Documentation as above in the note.    63 y/o female with r back pain in context of stretching yesterday.  Worse with movement.  Denies numbness tingling weakness incontinence.  No h/o ivda, hiv/aids, ca.  No red flags for back pain.  Will tx symptomatically give spine center f/u as o/p.

## 2025-01-19 NOTE — ED PROVIDER NOTE - PRINCIPAL DIAGNOSIS
1. Laceration was repaired with 7 stitches.  2. These will be in place for 7-10 days. They can be removed by your PCP or local ICC/ED.  3. Clean with warm, soapy water and cover with antibiotic ointment and bandage. No submersion of head underwater fully. Wear gloves/keep covered if performing dirty or contaminated activities.  4. After stitches are removed, to avoid darker scar appearance: Avoid direct sun exposure for 12 months - Wear hat/keep covered or put sunscreen on area. Use OTC scar reducing treatments such as Mederma or Vitamin E ointment.  5. Motrin or Tylenol as needed for pain. Ice and elevate as needed.  6. Follow-up with Occupational Health since the injury occurred at work.  7. Come back to the ED for any worsening pains/symptoms, signs of wound infection (redness, swelling, drainage), vomiting, weakness, or fevers.    1. La laceración se reparó con puntos 7.  2. Estos estarán en casanova lugar nixon 7-10 días. [place] PCP o el ICC/ED local pueden retirarlos.  3. Limpiar con agua jabonosa tibia y cubrir con pomada antibiótica y vendaje. No sumergir completamente la danna bajo el agua. Use guantes/manténgase cubierto si realiza actividades sucias o contaminadas.  4. Después de quitar los puntos, para evitar kalyani apariencia más oscura de la cicatriz: Evite la exposición directa al sol nixon 12 meses. Use un sombrero/manténgase cubierto o póngase protector solar en el área. Use tratamientos de venta erasmo para reducir las cicatrices, thelma Mederma o ungüento de vitamina E.  5. Motrin o Tylenol según sea necesario para el dolor. Hielo y eleve según sea necesario.  6. Seguimiento con [employer] desde que ocurrió la lesión en el trabajo.  7. Regrese al servicio de urgencias si empeora el dolor o los síntomas, signos de infección de la herida (enrojecimiento, hinchazón, drenaje), vómitos, debilidad o fiebre.   Back pain